# Patient Record
(demographics unavailable — no encounter records)

---

## 2024-10-14 NOTE — HISTORY OF PRESENT ILLNESS
[Delivery Date: ___] : on [unfilled] [] : delivered by vaginal delivery [Female] : Delivery History: baby girl [BF with Difficulty] : nursing with difficulty [Back to Normal] : is back to normal in size [Mild] : mild vaginal bleeding [Doing Well] : is doing well [No Sign of Infection] : is showing no signs of infection [Complications:___] : no complications [FreeTextEntry8] : Doing well. Normal lochia. Low supply, supplementing with formula. Denies s/s pp depression. [de-identified] : E 6/30 [de-identified] : 1. nothing per vagina x 6wks 2. RTO 4wks-- discuss contraception options, might consider IUD and cotesting HPV/PAP next visit

## 2024-11-11 NOTE — HISTORY OF PRESENT ILLNESS
[Delivery Date: ___] : on [unfilled] [] : delivered by vaginal delivery [Female] : Delivery History: baby girl [Back to Normal] : is back to normal in size [None] : no vaginal bleeding [Doing Well] : is doing well [No Sign of Infection] : is showing no signs of infection [Complications:___] : no complications [Breastfeeding] : not currently nursing [FreeTextEntry8] : Doing well. No vb. No longer breastfeeding due to low supply. Denies s/s pp depression. [de-identified] : 1. h/o cervical dysplasia-- PAP/HPV sent 2. We discussed reversible methods of contraception at length including OCPs, ring, patch, Depo Provera, and LARCs. Desires Kyleena IUD- will schedule insertion. 3.  RTO IUD insertion

## 2024-11-26 NOTE — PROCEDURE
[Colposcopy] : Colposcopy  [Time out performed] : Pre-procedure time out performed.  Patient's name, date of birth and procedure confirmed. [Consent Obtained] : Consent obtained [Risks] : risks [Benefits] : benefits [Alternatives] : alternatives [Patient] : patient [Infection] : infection [Bleeding] : bleeding [Allergic Reaction] : allergic reaction [LGSIL] : LGSIL [HPV High Risk] : HPV high risk [Colposcopy Adequate] : colposcopy adequate [SCI Fully Visualized] : SCI fully visualized [ECC Performed] : ECC performed [No Abnormalities] : no abnormalities [Biopsy] : biopsy taken [Hemostasis Obtained] : Hemostasis obtained [Tolerated Well] : the patient tolerated the procedure well [de-identified] : 2 [de-identified] : 2 and 9 o'clock [de-identified] : acetowhite at both sites [de-identified] : Dorian's solution

## 2024-12-03 NOTE — PROCEDURE
[IUD Placement] : intrauterine device (IUD) placement [Time out performed] : Pre-procedure time out performed.  Patient's name, date of birth and procedure confirmed. [Consent Obtained] : Consent obtained [Prevention of Pregnancy] : prevention of pregnancy [Risks] : risks [Benefits] : benefits [Alternatives] : alternatives [Patient] : patient [Infection] : infection [Bleeding] : bleeding [Pain] : pain [Expulsion] : expulsion [Failure] : failure [Uterine Perforation] : uterine perforation [Neg Pregnancy Test] : negative pregnancy test [Betadine] : Betadine [Easy Passage] : Easy passage [Post Placement Transvag. US] : post placement transvaginal ultrasound [Kyleena IUD] : Kyleena IUD [Tolerated Well] : Patient tolerated the procedure well [No Complications] : No complications [de-identified] : Jayen clamp [LMPDate] : 01/01/2024 [de-identified] : NDC :33683-995-28 [de-identified] : [id number] [de-identified] : 09/2026 [de-identified] : 09/2031

## 2024-12-03 NOTE — PROCEDURE
[IUD Placement] : intrauterine device (IUD) placement [Time out performed] : Pre-procedure time out performed.  Patient's name, date of birth and procedure confirmed. [Consent Obtained] : Consent obtained [Prevention of Pregnancy] : prevention of pregnancy [Risks] : risks [Benefits] : benefits [Alternatives] : alternatives [Patient] : patient [Infection] : infection [Bleeding] : bleeding [Pain] : pain [Expulsion] : expulsion [Failure] : failure [Uterine Perforation] : uterine perforation [Neg Pregnancy Test] : negative pregnancy test [Betadine] : Betadine [Easy Passage] : Easy passage [Post Placement Transvag. US] : post placement transvaginal ultrasound [Kyleena IUD] : Kyleena IUD [Tolerated Well] : Patient tolerated the procedure well [No Complications] : No complications [de-identified] : Jayne clamp [LMPDate] : 01/01/2024 [de-identified] : NDC :93049-163-49 [de-identified] : [id number] [de-identified] : 09/2026 [de-identified] : 09/2031

## 2025-05-27 NOTE — HISTORY OF PRESENT ILLNESS
[FreeTextEntry1] : NPA-CPE [de-identified] : 36 year F presents for annual physical exam. PMH Anxiety, obesity  Reports having new panic attacks and high anxiety.  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 Also, mom recently diagnosed with cancer and she brings mom to all doctors appts  She has been struggling to lose weight since pregnancy, 7 mos ago. Has been on 1 month of compound semiglutide from online doctor pre-pregnancy weight 185, ideally would like to be 160lbs  IUD- Kyleena She has been walkig more with her family

## 2025-05-27 NOTE — HEALTH RISK ASSESSMENT
[Yes] : Yes [Monthly or less (1 pt)] : Monthly or less (1 point) [1 or 2 (0 pts)] : 1 or 2 (0 points) [Never (0 pts)] : Never (0 points) [PHQ-2 Negative - No further assessment needed] : PHQ-2 Negative - No further assessment needed [Patient reported PAP Smear was abnormal] : Patient reported PAP Smear was abnormal [1] : 2) Feeling down, depressed, or hopeless for several days (1) [Several Days (1)] : 6.) Feeling bad about yourself, or that you are a failure, or have let yourself or your family down? Several days [Not at All (0)] : 9.) Thoughts that you would be off dead or of hurting yourself in some way? Not at all [Mild] : Severity of Depression is Mild [Not at all] : How difficult have these problems made it for you to do your work, take care of things at home, or get along with people? Not at all [Audit-CScore] : 1 [de-identified] : walking  [de-identified] : gettin better [[id number]] : 4 [[id number]] : 4 [EyeExamDate] : 03/2024 [Employed] : employed [] :  [# Of Children ___] : has [unfilled] children [Fully functional (bathing, dressing, toileting, transferring, walking, feeding)] : Fully functional (bathing, dressing, toileting, transferring, walking, feeding) [Fully functional (using the telephone, shopping, preparing meals, housekeeping, doing laundry, using] : Fully functional and needs no help or supervision to perform IADLs (using the telephone, shopping, preparing meals, housekeeping, doing laundry, using transportation, managing medications and managing finances) [Reports changes in hearing] : Reports no changes in hearing [Reports changes in vision] : Reports no changes in vision [Reports changes in dental health] : Reports no changes in dental health [PapSmearDate] : 11/2024 [PapSmearComments] : HPV; pt had colposcopy  [Never] : Never

## 2025-05-27 NOTE — PHYSICAL EXAM
We can write the letter, please draft.  As for the coughing, might be acid reflux but recommend he have an office visit to evaluate further.  Please offer with a partner if no availability this month with me.    [No Edema] : there was no peripheral edema [Normal] : affect was normal and insight and judgment were intact